# Patient Record
Sex: MALE | Race: WHITE | NOT HISPANIC OR LATINO | URBAN - METROPOLITAN AREA
[De-identification: names, ages, dates, MRNs, and addresses within clinical notes are randomized per-mention and may not be internally consistent; named-entity substitution may affect disease eponyms.]

---

## 2018-01-23 ENCOUNTER — HISTORICAL (OUTPATIENT)
Dept: ADMINISTRATIVE | Facility: HOSPITAL | Age: 28
End: 2018-01-23

## 2018-01-23 ENCOUNTER — HOSPITAL ENCOUNTER (OUTPATIENT)
Dept: MEDSURG UNIT | Facility: HOSPITAL | Age: 28
End: 2018-01-25
Attending: INTERNAL MEDICINE | Admitting: INTERNAL MEDICINE

## 2018-01-23 LAB
ABS NEUT (OLG): 4.64 X10(3)/MCL
ALBUMIN SERPL-MCNC: 4.2 GM/DL (ref 3.4–5)
ALBUMIN/GLOB SERPL: 1 RATIO (ref 1–2)
ALP SERPL-CCNC: 66 UNIT/L (ref 45–117)
ALT SERPL-CCNC: 17 UNIT/L (ref 12–78)
ANISOCYTOSIS BLD QL SMEAR: NORMAL
AST SERPL-CCNC: 12 UNIT/L (ref 15–37)
BASOPHILS # BLD AUTO: 0.04 X10(3)/MCL
BASOPHILS NFR BLD AUTO: 0 % (ref 0–1)
BILIRUB SERPL-MCNC: 0.4 MG/DL (ref 0.2–1)
BILIRUBIN DIRECT+TOT PNL SERPL-MCNC: 0.1 MG/DL
BILIRUBIN DIRECT+TOT PNL SERPL-MCNC: 0.3 MG/DL
BUN SERPL-MCNC: 12 MG/DL (ref 7–18)
CALCIUM SERPL-MCNC: 9 MG/DL (ref 8.5–10.1)
CHLORIDE SERPL-SCNC: 108 MMOL/L (ref 98–107)
CO2 SERPL-SCNC: 24 MMOL/L (ref 21–32)
COLOR STL: NORMAL
CONSISTENCY STL: NORMAL
CREAT SERPL-MCNC: 0.9 MG/DL (ref 0.6–1.3)
CROSSMATCH INTERPRETATION: NORMAL
EOSINOPHIL # BLD AUTO: 0.26 X10(3)/MCL
EOSINOPHIL NFR BLD AUTO: 4 % (ref 0–5)
ERYTHROCYTE [DISTWIDTH] IN BLOOD BY AUTOMATED COUNT: 15.3 % (ref 11.5–14.5)
FERRITIN SERPL-MCNC: 2.3 NG/ML (ref 26–388)
GLOBULIN SER-MCNC: 3.6 GM/ML (ref 2.3–3.5)
GLUCOSE SERPL-MCNC: 118 MG/DL (ref 74–106)
HCT VFR BLD AUTO: 18.4 % (ref 40–51)
HEMOCCULT SP1 STL QL: NEGATIVE
HGB BLD-MCNC: 5.4 GM/DL (ref 13.5–17.5)
HYPOCHROMIA BLD QL SMEAR: NORMAL
IMM GRANULOCYTES # BLD AUTO: 0.02 10*3/UL
IMM GRANULOCYTES NFR BLD AUTO: 0 %
IRON SATN MFR SERPL: 2 % (ref 15–50)
IRON SERPL-MCNC: 9 MCG/DL (ref 65–175)
LYMPHOCYTES # BLD AUTO: 2.08 X10(3)/MCL
LYMPHOCYTES NFR BLD AUTO: 28 % (ref 15–40)
MCH RBC QN AUTO: 20.1 PG (ref 26–34)
MCHC RBC AUTO-ENTMCNC: 29.3 GM/DL (ref 31–37)
MCV RBC AUTO: 68.4 FL (ref 80–100)
MICROCYTES BLD QL SMEAR: NORMAL
MONOCYTES # BLD AUTO: 0.48 X10(3)/MCL
MONOCYTES NFR BLD AUTO: 6 % (ref 4–12)
NEUTROPHILS # BLD AUTO: 4.64 X10(3)/MCL
NEUTROPHILS NFR BLD AUTO: 62 %
PLATELET # BLD AUTO: 489 X10(3)/MCL (ref 130–400)
PLATELET # BLD EST: NORMAL 10*3/UL
PMV BLD AUTO: 9.1 FL (ref 7.4–10.4)
POLYCHROMASIA BLD QL SMEAR: NORMAL
POTASSIUM SERPL-SCNC: 3.8 MMOL/L (ref 3.5–5.1)
PRODUCT READY: NORMAL
PROT SERPL-MCNC: 7.8 GM/DL (ref 6.4–8.2)
RBC # BLD AUTO: 2.69 X10(6)/MCL (ref 4.5–5.9)
RBC MORPH BLD: NORMAL
RET# (OHS): 0.05
RETICULOCYTE COUNT AUTOMATED (OLG): 1.7 % (ref 0.5–1.5)
SCHISTOCYTES BLD QL AUTO: NORMAL
SODIUM SERPL-SCNC: 141 MMOL/L (ref 136–145)
TIBC SERPL-MCNC: 448 MCG/DL (ref 250–450)
TRANSFERRIN SERPL-MCNC: 370 MG/DL (ref 200–360)
TRANSFUSION ORDER: NORMAL
TRANSFUSION ORDER: NORMAL
WBC # SPEC AUTO: 7.5 X10(3)/MCL (ref 4.5–11)

## 2018-01-24 LAB
ABS NEUT (OLG): 3.64 X10(3)/MCL (ref 2.1–9.2)
ALBUMIN SERPL-MCNC: 4 GM/DL (ref 3.4–5)
ALBUMIN/GLOB SERPL: 1 RATIO (ref 1–2)
ALP SERPL-CCNC: 65 UNIT/L (ref 45–117)
ALT SERPL-CCNC: 15 UNIT/L (ref 12–78)
APTT PPP: 26.3 SECOND(S) (ref 23.3–37)
AST SERPL-CCNC: 8 UNIT/L (ref 15–37)
BASOPHILS # BLD AUTO: 0.1 X10(3)/MCL
BASOPHILS NFR BLD AUTO: 1 % (ref 0–1)
BILIRUB SERPL-MCNC: 0.9 MG/DL (ref 0.2–1)
BILIRUBIN DIRECT+TOT PNL SERPL-MCNC: 0.2 MG/DL
BILIRUBIN DIRECT+TOT PNL SERPL-MCNC: 0.7 MG/DL
BUN SERPL-MCNC: 8 MG/DL (ref 7–18)
CALCIUM SERPL-MCNC: 8.6 MG/DL (ref 8.5–10.1)
CHLORIDE SERPL-SCNC: 104 MMOL/L (ref 98–107)
CO2 SERPL-SCNC: 26 MMOL/L (ref 21–32)
CREAT SERPL-MCNC: 0.7 MG/DL (ref 0.6–1.3)
EOSINOPHIL # BLD AUTO: 0.28 X10(3)/MCL
EOSINOPHIL NFR BLD AUTO: 3 % (ref 0–5)
ERYTHROCYTE [DISTWIDTH] IN BLOOD BY AUTOMATED COUNT: 20.5 % (ref 11.5–14.5)
GLOBULIN SER-MCNC: 3.3 GM/ML (ref 2.3–3.5)
GLUCOSE SERPL-MCNC: 93 MG/DL (ref 74–106)
HCT VFR BLD AUTO: 24.4 % (ref 40–51)
HGB BLD-MCNC: 7.7 GM/DL (ref 13.5–17.5)
IMM GRANULOCYTES # BLD AUTO: 0.02 10*3/UL
IMM GRANULOCYTES NFR BLD AUTO: 0 %
INR PPP: 1.06 (ref 0.9–1.2)
LYMPHOCYTES # BLD AUTO: 3.45 X10(3)/MCL
LYMPHOCYTES NFR BLD AUTO: 42 % (ref 15–40)
MCH RBC QN AUTO: 23.5 PG (ref 26–34)
MCHC RBC AUTO-ENTMCNC: 31.6 GM/DL (ref 31–37)
MCV RBC AUTO: 74.4 FL (ref 80–100)
MONOCYTES # BLD AUTO: 0.71 X10(3)/MCL
MONOCYTES NFR BLD AUTO: 9 % (ref 4–12)
NEUTROPHILS # BLD AUTO: 3.64 X10(3)/MCL
NEUTROPHILS NFR BLD AUTO: 44 X10(3)/MCL
PLATELET # BLD AUTO: 402 X10(3)/MCL (ref 130–400)
PMV BLD AUTO: 9.2 FL (ref 7.4–10.4)
POTASSIUM SERPL-SCNC: 3.6 MMOL/L (ref 3.5–5.1)
PROT SERPL-MCNC: 7.3 GM/DL (ref 6.4–8.2)
PROTHROMBIN TIME: 13.6 SECOND(S) (ref 11.9–14.4)
RBC # BLD AUTO: 3.28 X10(6)/MCL (ref 4.5–5.9)
SODIUM SERPL-SCNC: 140 MMOL/L (ref 136–145)
WBC # SPEC AUTO: 8.2 X10(3)/MCL (ref 4.5–11)

## 2018-01-25 LAB
ABS NEUT (OLG): 3.73 X10(3)/MCL (ref 2.1–9.2)
ALBUMIN SERPL-MCNC: 3.4 GM/DL (ref 3.4–5)
ALBUMIN/GLOB SERPL: 1 RATIO (ref 1–2)
ALP SERPL-CCNC: 54 UNIT/L (ref 45–117)
ALT SERPL-CCNC: 14 UNIT/L (ref 12–78)
AST SERPL-CCNC: 10 UNIT/L (ref 15–37)
BASOPHILS # BLD AUTO: 0.11 X10(3)/MCL
BASOPHILS NFR BLD AUTO: 1 % (ref 0–1)
BILIRUB SERPL-MCNC: 0.6 MG/DL (ref 0.2–1)
BILIRUBIN DIRECT+TOT PNL SERPL-MCNC: 0.2 MG/DL
BILIRUBIN DIRECT+TOT PNL SERPL-MCNC: 0.4 MG/DL
BUN SERPL-MCNC: 6 MG/DL (ref 7–18)
CALCIUM SERPL-MCNC: 8.2 MG/DL (ref 8.5–10.1)
CHLORIDE SERPL-SCNC: 110 MMOL/L (ref 98–107)
CO2 SERPL-SCNC: 27 MMOL/L (ref 21–32)
CREAT SERPL-MCNC: 0.7 MG/DL (ref 0.6–1.3)
EOSINOPHIL # BLD AUTO: 0.32 10*3/UL
EOSINOPHIL NFR BLD AUTO: 4 % (ref 0–5)
ERYTHROCYTE [DISTWIDTH] IN BLOOD BY AUTOMATED COUNT: 20.2 % (ref 11.5–14.5)
GLOBULIN SER-MCNC: 2.8 GM/ML (ref 2.3–3.5)
GLUCOSE SERPL-MCNC: 88 MG/DL (ref 74–106)
HCT VFR BLD AUTO: 21.9 % (ref 40–51)
HCT VFR BLD AUTO: 30.4 % (ref 40–51)
HGB BLD-MCNC: 6.9 GM/DL (ref 13.5–17.5)
HGB BLD-MCNC: 9.7 GM/DL (ref 13.5–17.5)
IMM GRANULOCYTES # BLD AUTO: 0.02 10*3/UL
IMM GRANULOCYTES NFR BLD AUTO: 0 %
LYMPHOCYTES # BLD AUTO: 3.56 X10(3)/MCL
LYMPHOCYTES NFR BLD AUTO: 42 % (ref 15–40)
MCH RBC QN AUTO: 23.7 PG (ref 26–34)
MCHC RBC AUTO-ENTMCNC: 31.5 GM/DL (ref 31–37)
MCV RBC AUTO: 75.3 FL (ref 80–100)
MONOCYTES # BLD AUTO: 0.7 X10(3)/MCL
MONOCYTES NFR BLD AUTO: 8 % (ref 4–12)
NEUTROPHILS # BLD AUTO: 3.73 X10(3)/MCL
NEUTROPHILS NFR BLD AUTO: 44 X10(3)/MCL
PLATELET # BLD AUTO: 371 X10(3)/MCL (ref 130–400)
PMV BLD AUTO: 9.1 FL (ref 7.4–10.4)
POTASSIUM SERPL-SCNC: 3.7 MMOL/L (ref 3.5–5.1)
PROT SERPL-MCNC: 6.2 GM/DL (ref 6.4–8.2)
RBC # BLD AUTO: 2.91 X10(6)/MCL (ref 4.5–5.9)
SODIUM SERPL-SCNC: 144 MMOL/L (ref 136–145)
WBC # SPEC AUTO: 8.4 X10(3)/MCL (ref 4.5–11)

## 2022-04-10 ENCOUNTER — HISTORICAL (OUTPATIENT)
Dept: ADMINISTRATIVE | Facility: HOSPITAL | Age: 32
End: 2022-04-10

## 2022-04-25 VITALS
HEIGHT: 74 IN | WEIGHT: 236.56 LBS | BODY MASS INDEX: 30.36 KG/M2 | DIASTOLIC BLOOD PRESSURE: 79 MMHG | SYSTOLIC BLOOD PRESSURE: 137 MMHG

## 2022-04-30 NOTE — OP NOTE
DATE OF SURGERY:    01/24/2018    SURGEON:  Jair Martinez MD    attending physician:  Deneen Kaur MD    PROCEDURE:  Colonoscopy to the cecum and terminal ileum.    PREOPERATIVE DIAGNOSES:    1. Iron deficiency anemia.  2. Rectal bleeding.    POSTOPERATIVE DIAGNOSES:    1. Iron deficiency anemia.  2. Rectal bleeding.    FINDINGS:  Hemorrhoidal congestion with otherwise normal exam to the cecum and terminal ileum.    SPECIMENS:  Zero.    BLOOD LOSS:  Zero.    DESCRIPTION OF PROCEDURE:  After informed consent was obtained, the patient was sedated by the anesthesia department with propofol, after which time the Olympus videocolonoscope was inserted into the rectum and advanced up the rectosigmoid colon, around the left colon, and down to the cecum identifying the base of the cecum and ileocecal valve.  The prep throughout the colon was excellent with no colonic mucosal abnormalities noted all the way around to the cecum.  Upon reaching the cecum, the scope was introduced into the terminal ileum, finding no distal small bowel abnormalities.  There was no blood evident in throughout the colon nor in the distal ileum.  The scope then was brought back into the cecum and slowly withdrawn back down the rectum, again finding no evidence of any colonic mucosal pathology.  The scope then was retroflexed in the rectal vault, finding some moderate hemorrhoidal congestion with no     other distal rectal abnormalities and no evidence of any active bleeding or recent bleeding.  The scope then was removed and the patient then sent to the holding area.        ______________________________  Jair Martinez MD    ES/MODL  DD:  01/24/2018  Time:  04:17PM  DT:  01/24/2018  Time:  04:54PM  Job #:  [id number]

## 2022-05-03 NOTE — HISTORICAL [facility] CERNER
This is a historical note converted from Rox. Formatting and pictures may have been removed.  Please reference Rox for original formatting and attached multimedia. Chief Complaint  Needs pcp  History of Present Illness  27-year-old white male presents to establish care in the internal medicine clinic accompanied by his wife.? Patient states he has been experiencing bright red blood per rectum for years now but it has intensified over the last year and he has developed lower quadrant abdominal pain over the last 2 weeks or so.? His wife tells me that he appears very pale to her and has become dizzy upon standing on 2 separate occasions at home but has not fainted.? Patient complains of palpitations, fatigue, dizziness, headaches but denies chest pain, shortness of breath.? He was seen in the emergency room at [facility] last month and was treated for a kidney stone but apparently was not told anything more in regards to his lab work.? He does have a known history of hemorrhoids but denies family history of IBD or colon cancer.  Review of Systems  ????Constitutional: No fever, No chills, No weakness, fatigue.  ?????Eye: No recent visual problem.  ?????Respiratory: No shortness of breath, No cough, No sputum production, No wheezing.  ?????Cardiovascular: No chest pain, palpitations, No peripheral edema.  ?????Gastrointestinal: No nausea, No vomiting, No diarrhea, No constipation, No heartburn, No abdominal pain.  ?????Genitourinary: No dysuria.  ?????Endocrine: No excessive thirst, No polyuria, No cold intolerance, No heat intolerance.  ?????Musculoskeletal: No back pain, No joint pain, No decreased range of motion.  ?????Integumentary: No rash, No pruritus.  ?????Neurologic: Alert and oriented X4, No numbness, No tingling, headache.  ?????Psychiatric: No anxiety, No depression.  Physical Exam  Vitals & Measurements  T:?37.0? ?C ?(Oral)? HR:?137?(Peripheral)? RR:?18?  BP:?137/79?  HT:?188?cm? HT:?188?cm? HT:?188?cm? WT:?107.3?kg? WT:?107.3?kg? WT:?107.3?kg? BMI:?30.36?  General: Alert and oriented, No acute distress. Pale  ?????Eye: Pupils are equal, round and reactive to light, Extraocular movements are intact, pale conjunctiva.  ?????HENT: Normocephalic, Oral mucosa is moist, No pharyngeal erythema.  ?????Neck: Supple, Non-tender.  ?????Respiratory: Lungs are clear to auscultation, Respirations are non-labored.  ?????Cardiovascular: Tachycardic, Regular rhythm, No murmur, Good pulses equal in all extremities, No edema.  ?????Gastrointestinal: Soft, Non-tender, Non-distended, Normal bowel sounds.  ???? Rectal:?External hemorrhoid?on visual examination,?difficulty on exam?as there seems to be?a mass vs. hemorrhoid?in the rectal vault.? Exam was performed with nurse Pamela present.  ?????Musculoskeletal: Normal range of motion, Normal strength.  ?????Integumentary: Warm, Dry. Pallor  ?????Neurologic: Alert, Oriented.  ?????Cognition and Speech: Oriented, Speech clear and coherent.  ?????Psychiatric: Cooperative, Appropriate mood & affect.  Assessment/Plan  Ordered:  CBC w/ Auto Diff, Stat collect, 01/23/18 13:58:00 CST, Blood, Order for future visit, Stop date 01/23/18 13:58:00 CST, Lab Collect, Wellness examination, 01/23/18 13:58:00 CST  Clinic Follow up, *Est. 04/23/18 3:00:00 CDT, Order for future visit, Wellness examination, [facility]  Comprehensive Metabolic Panel, Stat collect, 01/23/18 13:58:00 CST, Blood, Order for future visit, Stop date 01/23/18 13:58:00 CST, Lab Collect, Wellness examination, 01/23/18 13:58:00 CST  Ferritin, Routine collect, *Est. 01/23/18 3:00:00 CST, Blood, Order for future visit, *Est. Stop date 01/23/18 3:00:00 CST, Lab Collect, Rectal bleeding, On Exactly, 01/23/18 13:57:00 CST  Iron Binding Capacity Total - Iron Profile, Routine collect, *Est. 01/23/18 3:00:00 CST, Blood, Order for future visit, *Est. Stop date 01/23/18 3:00:00 CST, Lab  Collect, Rectal bleeding, On Exactly, 01/23/18 13:57:00 CST  Reticulocyte Count, Routine collect, *Est. 01/23/18 3:00:00 CST, Blood, Order for future visit, *Est. Stop date 01/23/18 3:00:00 CST, Lab Collect, Rectal bleeding, On Exactly, 01/23/18 13:57:00 CST  Urinalysis with Microscopic if Indicated, Routine collect, Urine, Order for future visit, 01/23/18 13:32:00 CST, Stop date 01/23/18 13:32:00 CST, Nurse collect, Wellness examination, 01/23/18 13:32:00 CST  ?  1. ?Wellness: CBC with differential, CMP and urinalysis today.  2.? Rectal bleeding?with what seems to be symptomatic anemia:?EKG today,?CBC with differential, CMP and iron studies.?  Case has been discussed with Dr. Barlow and patient will be admitted to the family medicine on call team?given his current condition.  ?  Patient and wife voiced understanding.   Problem List/Past Medical History  Ongoing  Obesity  Historical  Procedure/Surgical History  Tonsillectomy and adenoidectomy  Medications  No active medications  Allergies  No Known Allergies  Social History  Alcohol - 01/23/2018  Past  Employment/School - 01/23/2018  Unemployed, Highest education level: Some college.  Exercise - 01/23/2018  Exercise type: Yoga.  Home/Environment - 01/23/2018  Lives with Children, Spouse. Living situation: Home/Independent. Alcohol abuse in household: No. Substance abuse in household: No. Smoker in household: No. Feels unsafe at home: No. Safe place to go: Yes. Family/Friends available for support: Yes. Concern for family members at home: No. Major illness in household: No.  Nutrition/Health - 01/23/2018  Regular, Wants to lose weight: No. Sleeping concerns: Yes. Feels highly stressed: Yes.  Substance Abuse - 01/23/2018  Never  Tobacco - 01/23/2018  Former smoker, Cigarettes, Started age 16.0 Years. Stopped age 26 Years.  Family History  Kidney cancer, primary, with metastasis from kidney to other site 27-APR-2016 23:35:49<$>: Mother.      H/H 5.4/18.4   Patient  will be admitted to Internal Medicine Team # 2 under Deneen Kaur MD

## 2022-05-03 NOTE — HISTORICAL [facility] CERNER
This is a historical note converted from Rox. Formatting and pictures may have been removed.  Please reference Rox for original formatting and attached multimedia. Chief Complaint  fatigue, BRBPR  Reason for Consultation  BRBPR  History of Present Illness  28 yo M with no PMH presented to the?ED with symptomatic anemia and BRBPR.? In the ED his Hb with 5.4, he was given 3 units of blood.? Hb responded to 7.7.? Yesterday, he had a CSC which showed engorged hemorrhoidal veins, otherwise it was wnl.? He states that he notices bleeding after BM on the toilet, however, recently this has worsened and he has had several bloody BM.? ?Surgery consulted for evaluation of bleeding.?  ?  Today, the patient denies F/C/ CP/SOB.? He states that he has been taking NSAIDs daily for headaches.? He denies any easy bleeding or bruising.? He denies any BRBPR today.  Review of Systems  negative except as stated in HPI  Physical Exam  Vitals & Measurements  T:?36.8? ?C ?(Oral)? TMIN:?36.4? ?C ?(Oral)? TMAX:?37.0? ?C ?(Oral)? HR:?97?(Monitored)? RR:?18? BP:?137/75? SpO2:?99%? WT:?107.3?kg?  Gen: NAD, AAOx3  CV: RRR, no murmur  Pulm: CTAB, no wheezes or crackles  Abd: s/nt/nd/+BS  Rectal: internal prolapsing hemorrhoids visualized, no blood, prostate not enlarged  Assessment/Plan  28 yo M with GI bleed with unknown source  -recommend repeat CSC and EGD if bleeding continues or if H/H worsens  -do not recommend surgery at this time  -recommend Meckels scan  -will f/u RBC scan  - trend H/H  -continue NPO  -will continue to follow   Problem List/Past Medical History  Ongoing  Obesity  Historical  Procedure/Surgical History  Colonoscopy (01/24/2018)  Tonsillectomy and adenoidectomy  Medications  Inpatient  buffered lidocaine 2% - 0.5 ml syringe, 10 mg= 0.5 mL, Subcutaneous, As Directed  diphenhdramine (for Inj.), 25 mg= 0.5 mL, IV, Once-Unscheduled, PRN  GI Cocktail - [facility]  IVF Lactated Ringers LR Infusion 1,000 mL, 1000 mL, IV  [id number]  1,000 mL, 1000 mL, IV  Normal Saline (0.9% NS)  mL, 500 mL, IV  NS (0.9% Sodium Chloride) Infusion 1,000 mL, 1000 mL, IV  traMADol 50 mg oral tablet, 50 mg= 1 tab(s), Oral, q4hr, PRN  Home  No active home medications  Allergies  No Known Allergies  Social History  Alcohol - 01/23/2018  Past  Employment/School - 01/23/2018  Unemployed, Highest education level: Some college.  Exercise - 01/23/2018  Exercise type: Yoga.  Home/Environment - 01/23/2018  Lives with Children, Spouse. Living situation: Home/Independent. Alcohol abuse in household: No. Substance abuse in household: No. Smoker in household: No. Feels unsafe at home: No. Safe place to go: Yes. Family/Friends available for support: Yes. Concern for family members at home: No. Major illness in household: No.  Nutrition/Health - 01/23/2018  Regular, Wants to lose weight: No. Sleeping concerns: Yes. Feels highly stressed: Yes.  Substance Abuse - 01/23/2018  Never  Tobacco - 01/23/2018  Former smoker, Cigarettes, Started age 16.0 Years. Stopped age 26 Years.  Family History  Kidney cancer, primary, with metastasis from kidney to other site 27-APR-2016 23:35:49<$>: Mother.  Lab Results  Labs Last 24 Hours?  ?Chemistry Hematology/Coagulation   Sodium Lvl: 144 mmol/L (01/25/18 06:39:42) WBC: 8.4 x10(3)/mcL (01/25/18 06:38:21)   Potassium Lvl: 3.7 mmol/L (01/25/18 06:39:42) RBC:?2.91 x10(6)/mcL?Low (01/25/18 06:38:21)   Chloride:?110 mmol/L?High (01/25/18 06:39:42) Hgb:?6.9 gm/dL?Critical (01/25/18 06:38:21)   CO2: 27 mmol/L (01/25/18 06:39:42) Hct:?21.9 %?Critical (01/25/18 06:38:21)   Calcium Lvl:?8.2 mg/dL?Low (01/25/18 06:39:42) Platelet: 371 x10(3)/mcL (01/25/18 06:38:21)   Glucose Lvl: 88 mg/dL (01/25/18 06:39:42) MCV:?75.3 fL?Low (01/25/18 06:38:21)   BUN:?6 mg/dL?Low (01/25/18 06:39:42) MCH:?23.7 pg?Low (01/25/18 06:38:21)   Creatinine: 0.7 mg/dL (01/25/18 06:39:42) MCHC: 31.5 gm/dL (01/25/18 06:38:21)   eGFR-AA: >105 (01/25/18 06:39:43) RDW:?20.2 %?High  (01/25/18 06:38:21)   eGFR-CARLOTA: >105 (01/25/18 06:39:44) MPV: 9.1 fL (01/25/18 06:38:21)   Bili Total: 0.6 mg/dL (01/25/18 06:39:42) Abs Neut: 3.73 x10(3)/mcL (01/25/18 06:38:21)   Bili Direct: 0.2 mg/dL (01/25/18 06:39:42) Neutro Auto: 44 x10(3)/mcL (01/25/18 06:38:23)   Bili Indirect: 0.4 mg/dL (01/25/18 06:39:42) Lymph Auto:?42 %?High (01/25/18 06:38:23)   AST:?10 unit/L?Low (01/25/18 06:39:42) Mono Auto: 8 % (01/25/18 06:38:23)   ALT: 14 unit/L (01/25/18 06:39:42) Eos Auto: 4 % (01/25/18 06:38:23)   Alk Phos: 54 unit/L (01/25/18 06:39:42) Abs Eos: 0.32 (01/25/18 06:38:23)   Total Protein:?6.2 gm/dL?Low (01/25/18 06:39:42) Basophil Auto: 1 % (01/25/18 06:38:23)   Albumin Lvl: 3.4 gm/dL (01/25/18 06:39:42) Abs Neutro: 3.73 x10(3)/mcL (01/25/18 06:38:23)   Globulin: 2.8 gm/mL (01/25/18 06:39:42) Abs Lymph: 3.56 x10(3)/mcL (01/25/18 06:38:23)   A/G Ratio: 1 ratio (01/25/18 06:39:42) Abs Mono: 0.7 x10(3)/mcL (01/25/18 06:38:23)    Abs Baso: 0.11 x10(3)/mcL (01/25/18 06:38:23)    IG%: 0 % (01/25/18 06:38:23)    IG#: 0.02 (01/25/18 06:38:23)   ?  ?  ?  ?  Diagnostic Results  1/24 CSC: engorged hemorrhoidal veins      Above case and hx reviewed.  Agree with plan of care.

## 2023-09-22 ENCOUNTER — HOSPITAL ENCOUNTER (EMERGENCY)
Facility: HOSPITAL | Age: 33
Discharge: HOME | End: 2023-09-22
Attending: STUDENT IN AN ORGANIZED HEALTH CARE EDUCATION/TRAINING PROGRAM
Payer: MEDICAID

## 2023-09-22 VITALS
HEART RATE: 75 BPM | TEMPERATURE: 98 F | DIASTOLIC BLOOD PRESSURE: 89 MMHG | SYSTOLIC BLOOD PRESSURE: 143 MMHG | BODY MASS INDEX: 28.88 KG/M2 | OXYGEN SATURATION: 98 % | WEIGHT: 225 LBS | RESPIRATION RATE: 18 BRPM

## 2023-09-22 DIAGNOSIS — S39.012A STRAIN OF LUMBAR REGION, INITIAL ENCOUNTER: Primary | ICD-10-CM

## 2023-09-22 PROCEDURE — 25000003 PHARM REV CODE 250: Performed by: STUDENT IN AN ORGANIZED HEALTH CARE EDUCATION/TRAINING PROGRAM

## 2023-09-22 PROCEDURE — 99284 EMERGENCY DEPT VISIT MOD MDM: CPT

## 2023-09-22 PROCEDURE — 96372 THER/PROPH/DIAG INJ SC/IM: CPT | Performed by: STUDENT IN AN ORGANIZED HEALTH CARE EDUCATION/TRAINING PROGRAM

## 2023-09-22 PROCEDURE — 63600175 PHARM REV CODE 636 W HCPCS: Performed by: STUDENT IN AN ORGANIZED HEALTH CARE EDUCATION/TRAINING PROGRAM

## 2023-09-22 RX ORDER — HYDROCODONE BITARTRATE AND ACETAMINOPHEN 7.5; 325 MG/1; MG/1
1 TABLET ORAL EVERY 6 HOURS PRN
Status: DISCONTINUED | OUTPATIENT
Start: 2023-09-22 | End: 2023-09-22 | Stop reason: HOSPADM

## 2023-09-22 RX ORDER — KETOROLAC TROMETHAMINE 10 MG/1
10 TABLET, FILM COATED ORAL 3 TIMES DAILY
Qty: 15 TABLET | Refills: 0 | Status: SHIPPED | OUTPATIENT
Start: 2023-09-22 | End: 2023-09-27

## 2023-09-22 RX ORDER — METHOCARBAMOL 750 MG/1
1500 TABLET, FILM COATED ORAL 2 TIMES DAILY PRN
Qty: 20 TABLET | Refills: 0 | Status: SHIPPED | OUTPATIENT
Start: 2023-09-22 | End: 2023-09-27

## 2023-09-22 RX ORDER — METHOCARBAMOL 500 MG/1
1000 TABLET, FILM COATED ORAL
Status: COMPLETED | OUTPATIENT
Start: 2023-09-22 | End: 2023-09-22

## 2023-09-22 RX ORDER — KETOROLAC TROMETHAMINE 30 MG/ML
30 INJECTION, SOLUTION INTRAMUSCULAR; INTRAVENOUS
Status: COMPLETED | OUTPATIENT
Start: 2023-09-22 | End: 2023-09-22

## 2023-09-22 RX ADMIN — KETOROLAC TROMETHAMINE 30 MG: 30 INJECTION, SOLUTION INTRAMUSCULAR at 09:09

## 2023-09-22 RX ADMIN — HYDROCODONE BITARTRATE AND ACETAMINOPHEN 1 TABLET: 7.5; 325 TABLET ORAL at 09:09

## 2023-09-22 RX ADMIN — METHOCARBAMOL 1000 MG: 500 TABLET ORAL at 09:09

## 2023-09-23 NOTE — ED NOTES
Patient still with pain, offered for him to wait for an hour to see if it will kick in. Patient states he wants to go home.

## 2023-09-23 NOTE — ED PROVIDER NOTES
"Encounter Date: 2023       History     Chief Complaint   Patient presents with    Back Pain     Onset yesterday. Reports while changing infant son diaper, when stood up straight, lower back pain began. Pt states hard to stand up.      Patient is a 32-year-old white gentleman no significant past medical history presented to the ER today due to lower pain.  Patient states he stood up "wrong. " patient states he was changing his  son at the time.  Patient states this occurred yesterday and he is had pain ever since.  Patient denies any urinary incontinence, urinary retention, fecal incontinence, saddle anesthesia.  Pain chin points to his left lower paraspinal region in the L5-S1 region as the source of the pain and denies radiation.  He states it is worse with movement and better with rest.  Patient has not tried anything for relief.  He denies any other complaints this time.      Review of patient's allergies indicates:  No Known Allergies  No past medical history on file.  No past surgical history on file.  No family history on file.     Review of Systems   Constitutional:  Negative for chills, fatigue and fever.   HENT:  Negative for congestion, sore throat and trouble swallowing.    Eyes:  Negative for pain and visual disturbance.   Respiratory:  Negative for cough, shortness of breath and wheezing.    Cardiovascular:  Negative for chest pain and palpitations.   Gastrointestinal:  Negative for abdominal pain, blood in stool, constipation, diarrhea, nausea and vomiting.   Genitourinary:  Negative for dysuria and hematuria.   Musculoskeletal:  Positive for back pain. Negative for myalgias.   Skin:  Negative for rash and wound.   Neurological:  Negative for seizures, syncope and headaches.   Psychiatric/Behavioral:  Negative for confusion. The patient is not nervous/anxious.        Physical Exam     Initial Vitals [23]   BP Pulse Resp Temp SpO2   (!) 141/93 74 (!) 22 98.3 °F (36.8 °C) 99 %    "   MAP       --         Physical Exam    Nursing note and vitals reviewed.  Constitutional: He appears well-developed and well-nourished. No distress.   HENT:   Head: Normocephalic and atraumatic.   Eyes: Conjunctivae and EOM are normal. Right eye exhibits no discharge. Left eye exhibits no discharge. No scleral icterus.   Neck: No tracheal deviation present.   Normal range of motion.  Cardiovascular:  Normal rate, regular rhythm and normal heart sounds.     Exam reveals no gallop and no friction rub.       No murmur heard.  Pulmonary/Chest: Breath sounds normal. No respiratory distress. He has no wheezes. He has no rhonchi. He has no rales.   Abdominal: Abdomen is soft. He exhibits no distension. There is no abdominal tenderness. There is no rebound and no guarding.   Musculoskeletal:         General: No edema. Normal range of motion.      Cervical back: Normal range of motion.      Comments: There is no C, T, L-spine tenderness no step-off lesions.  Reproducible palpation pain in the left lower paraspinal region L5-S1.  No obvious deformities or signs of trauma on exam.     Neurological: He is alert.   Skin: Skin is warm and dry. No rash and no abscess noted. No erythema. No pallor.   Psychiatric: His behavior is normal. Judgment normal.         ED Course   Procedures  Labs Reviewed - No data to display       Imaging Results              X-Ray Lumbar Spine Ap And Lateral (In process)                   X-Rays:   Independently Interpreted Readings:   Other Readings:  X-ray lumbar spine:  No acute osseous abnormalities per my read.    Medications   HYDROcodone-acetaminophen 7.5-325 mg per tablet 1 tablet (1 tablet Oral Given 9/22/23 2121)   ketorolac injection 30 mg (30 mg Intramuscular Given 9/22/23 2122)   methocarbamoL tablet 1,000 mg (1,000 mg Oral Given 9/22/23 2121)     Medical Decision Making  Differentials:  Lumbar strain, vertebral fracture, dislocation, cauda equina syndrome   32-year-old white male presents  well-appearing with stable vital signs with lower back pain.  He denies all red flag symptoms of cauda equina syndrome.  Pain is reproducible with palpation of the paraspinal muscles does lumbar strain seems leads differentials.  Norco, Robaxin and Toradol given as patient has a ride home.  X-ray showed no acute osseous abnormalities per my read.  Will send patient home on Toradol, Robaxin as needed.  Rice therapy advised.  All questions answered in layman's terms and return precautions were discussed.    Amount and/or Complexity of Data Reviewed  Radiology: ordered and independent interpretation performed. Decision-making details documented in ED Course.    Risk  Prescription drug management.                               Clinical Impression:   Final diagnoses:  [S39.012A] Strain of lumbar region, initial encounter (Primary)        ED Disposition Condition    Discharge Stable          ED Prescriptions       Medication Sig Dispense Start Date End Date Auth. Provider    ketorolac (TORADOL) 10 mg tablet Take 1 tablet (10 mg total) by mouth 3 (three) times daily. for 5 days 15 tablet 9/22/2023 9/27/2023 Anival Sargent MD    methocarbamoL (ROBAXIN) 750 MG Tab Take 2 tablets (1,500 mg total) by mouth 2 (two) times daily as needed (pain). 20 tablet 9/22/2023 9/27/2023 Anival Sargent MD          Follow-up Information       Follow up With Specialties Details Why Contact Info    [facility] Emergency Medicine  If symptoms worsen [address] [place] [id number] [phone]             Anival Sargent MD  09/22/23 [id number]